# Patient Record
Sex: MALE | Race: WHITE
[De-identification: names, ages, dates, MRNs, and addresses within clinical notes are randomized per-mention and may not be internally consistent; named-entity substitution may affect disease eponyms.]

---

## 2019-02-28 NOTE — EDM.PDOC
<Jacque Lazo NINO - Last Filed: 19 22:02>





ED HPI GENERAL MEDICAL PROBLEM





- General


Chief Complaint: Chest Pain


Stated Complaint: DIZZY/CHEST PRESSURE


Time Seen by Provider: 19 20:45


Source of Information: Reports: Patient, RN Notes Reviewed, Significant Other


History Limitations: Reports: No Limitations





- History of Present Illness


INITIAL COMMENTS - FREE TEXT/NARRATIVE: 





Brant is a 28 yo male who presents to the ER with complaints of chest 

discomfort in the epigastric region that radiates to the left chest. Wife and 

daughter are in the room with him. His main concern is the dizzy spells that he 

has had today. He did not black out but had to really concentrate to stay up 

right. He had been sitting but stood up and the dizziness got better. His worst 

episode was when he crawled up into the crane and thought he went up to fast. 

He does have a history of GERD and was started on Wellbutrin for depression 

about 3 1/2 weeks ago. He is under a lot of stress due to his grandmother being 

ill and needing to leave for [place] because of this.  





Onset: Today


Onset Date: 19


Onset Time: 16:00


Duration: Intermittent


Location: Reports: Head, Chest


Quality: Reports: Sharp


Severity: Moderate


Improves with: Reports: None


Context: Reports: Activity


Associated Symptoms: Reports: Other (dizziness)


  ** Chest


Pain Score (Numeric/FACES): 1





- Related Data


 Allergies











Allergy/AdvReac Type Severity Reaction Status Date / Time


 


No Known Allergies Allergy   Verified 19 20:40











Home Meds: 


 Home Meds





Levothyroxine 75 mg PO DAILY 19 [History]


Omeprazole 20 mg PO DAILY PRN 19 [History]


buPROPion [Wellbutrin] 300 mg PO DAILY 19 [History]











Past Medical History


Gastrointestinal History: Reports: GERD, Other (See Below)


Other Gastrointestinal History: acid reflux


Psychiatric History: Reports: Depression (on Wellbutrin)





- Past Surgical History


HEENT Surgical History: Reports: Oral Surgery





Social & Family History





- Family History


Other Cardiac Family History: Uncle  around age 40 of cardiac issues.





- Tobacco Use


Smoking Status *Q: Former Smoker


Used Tobacco, but Quit: Yes


Month/Year Tobacco Last Used: 18mo ago





- Caffeine Use


Caffeine Use: Reports: Coffee, Energy Drinks, Soda, Tea





- Recreational Drug Use


Recreational Drug Use: No





ED ROS GENERAL





- Review of Systems


Review Of Systems: See Below


Constitutional: Reports: No Symptoms


HEENT: Reports: No Symptoms


Respiratory: Reports: No Symptoms


Cardiovascular: Reports: Chest Pain


Endocrine: Reports: No Symptoms


GI/Abdominal: Reports: No Symptoms


: Reports: No Symptoms


Musculoskeletal: Reports: No Symptoms


Skin: Reports: No Symptoms


Neurological: Reports: Dizziness


Psychiatric: Reports: Anxiety, Depression


Hematologic/Lymphatic: Reports: No Symptoms


Immunologic: Reports: No Symptoms





ED EXAM, GENERAL





- Physical Exam


Exam: See Below


Exam Limited By: No Limitations


General Appearance: Alert, WD/WN, Anxious


Ears: Normal External Exam


Nose: Normal Inspection


Throat/Mouth: Normal Lips


Head: Atraumatic, Normocephalic


Neck: Normal Inspection, Supple


Respiratory/Chest: No Respiratory Distress, Lungs Clear, Normal Breath Sounds, 

No Accessory Muscle Use, Chest Non-Tender


Cardiovascular: Normal Peripheral Pulses, Regular Rate, Rhythm, No Edema, No 

Gallop, No JVD, No Murmur, No Rub


GI/Abdominal: Normal Bowel Sounds, Soft, Non-Tender, No Distention


 (Male) Exam: Deferred


Rectal (Males) Exam: Deferred


Neurological: Alert, Oriented, CN II-XII Intact, Normal Cognition


Psychiatric: Anxious, Tearful (patient appears stressed. )


Skin Exam: Warm, Dry, Intact, Normal Color, No Rash





Course





- Vital Signs


Last Recorded V/S: 


 Last Vital Signs











Temp  36.6 C   19 20:38


 


Pulse  97   19 20:38


 


Resp  18   19 20:38


 


BP  160/99 H  19 20:38


 


Pulse Ox  98   19 20:38








 





Orthostatic Blood Pressure [     150/88


Standing]                        


Orthostatic Blood Pressure [     141/90


Supine]                          











- Orders/Labs/Meds


Orders: 


 Active Orders 24 hr











 Category Date Time Status


 


 EKG Documentation Completion [RC] STAT Care  19 21:01 Active


 


 Orthostatic Vital Signs [RC] ASDIRECTED Care  19 21:00 Active


 


 Chest 1V Frontal [CR] Stat Exams  19 21:01 Taken


 


 Sodium Chloride 0.9% [Normal Saline] 1,000 ml Med  19 21:15 Active





 IV ASDIRECTED   








 Medication Orders





Sodium Chloride (Normal Saline)  1,000 mls @ 100 mls/hr IV ASDIRECTED VINICIO


   Last Admin: 19 21:15  Dose: 100 mls/hr








Labs: 


 Laboratory Tests











  19 Range/Units





  21:10 21:10 21:10 


 


WBC  7.20    (4.23-9.07)  K/mm3


 


RBC  5.41    (4.63-6.08)  M/mm3


 


Hgb  15.5    (13.7-17.5)  gm/L


 


Hct  46.4    (40.1-51.0)  %


 


MCV  85.8    (79.0-92.2)  fl


 


MCH  28.7    (25.7-32.2)  pg


 


MCHC  33.4    (32.2-35.5)  g/dl


 


RDW Std Deviation  40.4    (35.1-43.9)  fL


 


Plt Count  287    (163-337)  K/mm3


 


MPV  10.8    (9.4-12.3)  fl


 


Neutrophils % (Manual)  72 H    (40-60)  %


 


Band Neutrophils %  1    (0-10)  %


 


Lymphocytes % (Manual)  21    (20-40)  %


 


Atypical Lymphs %  0    %


 


Monocytes % (Manual)  5    (2-10)  %


 


Eosinophils % (Manual)  1    (0.8-7.0)  %


 


Basophils % (Manual)  0 L    (0.2-1.2)  


 


Platelet Estimate  Adequate    


 


RBC Morph Comment  Normal    


 


D-Dimer, Quantitative    < 0.19 L  (0.19-0.50)  mg/L


 


Sodium   138   (136-145)  mEq/L


 


Potassium   3.3 L   (3.5-5.1)  mEq/L


 


Chloride   103   ()  mEq/L


 


Carbon Dioxide   26   (21-32)  mEq/L


 


Anion Gap   12.3   (5-15)  


 


BUN   13   (7-18)  mg/dL


 


Creatinine   1.3   (0.7-1.3)  mg/dL


 


Est Cr Clr Drug Dosing   84.60   mL/min


 


Estimated GFR (MDRD)   > 60   (>60)  mL/min


 


BUN/Creatinine Ratio   10.0 L   (14-18)  


 


Glucose   126 H   ()  mg/dL


 


Calcium   8.9   (8.5-10.1)  mg/dL


 


Magnesium   1.9   (1.8-2.4)  mg/dl


 


Total Bilirubin   0.5   (0.2-1.0)  mg/dL


 


AST   26   (15-37)  U/L


 


ALT   60   (16-63)  U/L


 


Alkaline Phosphatase   69   ()  U/L


 


CK-MB (CK-2)   1.0   (0-3.6)  ng/ml


 


Troponin I   < 0.017   (0.00-0.056)  ng/mL


 


C-Reactive Protein   0.9   (<1.0)  mg/dL


 


Total Protein   7.7   (6.4-8.2)  g/dl


 


Albumin   4.1   (3.4-5.0)  g/dl


 


Globulin   3.6   gm/dL


 


Albumin/Globulin Ratio   1.1   (1-2)  











Meds: 


Medications











Generic Name Dose Route Start Last Admin





  Trade Name Freq  PRN Reason Stop Dose Admin


 


Sodium Chloride  1,000 mls @ 100 mls/hr  19 21:15  19 21:15





  Normal Saline  IV   100 mls/hr





  ASDIRECTED VINICIO   Administration





     





     





     





     














Discontinued Medications














Generic Name Dose Route Start Last Admin





  Trade Name Freq  PRN Reason Stop Dose Admin


 


Al Hydroxide/Mg Hydroxide 30  0 ml  19 21:50  19 21:54





  ml/ Lidocaine HCl 15 ml  PO  19 21:51  45 ml





  ONETIME ONE   Administration





     





     





     





     














- Re-Assessments/Exams


Free Text/Narrative Re-Assessment/Exam: 





19 21:20  spoke with patient and explained the labs, ekg and chest xray. 

Will give him a GI cocktail to help with the epigastric discomfort. Pt is not 

real sure about the cocktail because he doesn't think it is reflux, explained 

that it will not hurt him but will help if it is reflux. 





19 21:59  Spoke with patient and is wife about ekg and chest xray. 

reassurance given that they are normal. Waiting for labs. Will give the GI 

cocktail and see if that helps. Will update him on labs when they are all 

complete. 








Departure





- Departure


Disposition: Home, Self-Care 01


Clinical Impression: 


 Non-cardiac chest pain





Instructions:  Nonspecific Chest Pain


Referrals: 


Arlen Solares NP [Primary Care Provider] - 


Forms:  ED Department Discharge


Additional Instructions: 


Evaluation the emergency room tonight in regards to central chest discomfort 

started tonight. Recognizing that you are under good deal of stress with 

grandmother's illness in [place] with plans to travel there tonight. All of the 

investigations done through the emergency room tonight including heart tracing, 

chest x-ray, lab tests to rule out heart attack a look at what was called 

cardiac markers that can only come from damaged heart muscle are negative. Also 

no evidence of any blood clot in the lung. Therefore all the tests that we ran 

show no evidence of heart related illness. History of gastroesophageal reflux 

appreciated. Is often the cause of chest pain with spasm of the lower food pipe 

due to reflux of some acid into the lower one third of the food pipe while we 

are sleeping. This can leave the food pipe irritated and inflamed and can cause 

spasm and chest pains for 10 days after one bad night of reflux. I would 

therefore suggest going on Prilosec at bedtime daily for the next 2 weeks and 

then every other night to prevent permanent damage to the lower food pipe. 





- My Orders


Last 24 Hours: 


My Active Orders





19 21:00


Orthostatic Vital Signs [RC] ASDIRECTED 





19 21:01


EKG Documentation Completion [RC] STAT 


Chest 1V Frontal [CR] Stat 





19 21:15


Sodium Chloride 0.9% [Normal Saline] 1,000 ml IV ASDIRECTED 














- Assessment/Plan


Last 24 Hours: 


My Active Orders





19 21:00


Orthostatic Vital Signs [RC] ASDIRECTED 





19 21:01


EKG Documentation Completion [RC] STAT 


Chest 1V Frontal [CR] Stat 





19 21:15


Sodium Chloride 0.9% [Normal Saline] 1,000 ml IV ASDIRECTED 














<Herb Nathan - Last Filed: 19 22:40>





Past Medical History


Gastrointestinal History: Reports: GERD





Social & Family History





- Tobacco Use


Smoking Status *Q: Former Smoker (Quit cigarettes about 18 months ago was 1 pack

-a-day smoker)





- Living Situation & Occupation


Living situation: Reports: 


Occupation: Employed





EKG INTERPRETATION


EKG Date: 19


Time: 21:22


Rhythm: NSR


Rate (Beats/Min): 86


Axis: Normal


P-Wave: Present


QRS: Other (Nonspecific intraventricular trickier conduction delay pattern. 

Mildly decreased voltage in the precordial leads)


ST-T: Normal


QT: Normal


EKG Interpretation Comments: 





No signs of ischemia





Course





- Radiology Interpretation


Free Text/Narrative:: 





29-year-old male presents to the ED with diffuse epigastric lower retrosternal 

chest discomfort. Recognizes under good deal of duress at present is his 

grandmother is very ill in [place] and they have plans to [place] driving towards [place] 

told tonight to trying to the bedside before she passes. This evening he 

developed a pressure sensation in his epigastrium and lower retrosternal chest 

with no radiation to the neck back or arm. He has no known cardiac history. He 

does have a history of gastroesophageal reflux disease and often will wake up 

at night with acid in his mouth and throat. He uses Prilosec on a when 

necessary basis usually when he has symptoms but often will go several weeks 

without needing it. He denies cough or sputum production. Does feel mildly 

short of breath. Describes the discomfort with a bit of a burning discomfort to 

it. Plan ECG done by triage nurse shows sinus rhythm at 86/m with nonspecific 

intraventricular conduction delay pattern. There is no signs of ischemia. Plan 

1 view chest x-ray routine labs including cardiac markers and d-dimer to be 

done.





- Re-Assessments/Exams


Free Text/Narrative Re-Assessment/Exam: 








19 21:51 Hematology reveals a normal white count at 7.20 with 72% 

neutrophils and 1% band cells. Hemoglobin is 15.5 with hematocrit of 46.4. 

Platelet count is 287,000. D-dimer is less than 0.19. Chest x-ray is within 

normal limits. ECG is sinus rhythm at 86/m with a nonspecific intraventricular 

conduction delay and mildly decreased voltage in the precordial leads but no 

signs of ischemia.





19 22:07 Sodium is 138 with a potassium slightly low at 3.3. Chloride 103 

with a bicarbonate 26. Anion gap is 12.3 with a BUN of 13 and a creatinine of 

1.3. GFR is greater than 60. BUN/creatinine ratio is 10.0. Glucose 126. Calcium 

8.9 with magnesium of 1.9. Liver function is normal. CK-MB is 1.0 with troponin 

I of less than 0.017. C-reactive protein is 0.9. Total protein is 7.7 with an 

albumin fraction of 4.1.





19 22:20: Discussed the findings with the patient. Reassured that there 

is no evidence that he has any heart related illness. I feel it is safe for him 

[place] drive [place] alternating with his wife so they get adequate 

sleep. I believe his pain is likely gastroesophageal in origin with 

gastroesophageal reflux into the lower food pipe likely during sleep. Advised 

him to take Prilosec every day at bedtime for the next 2 weeks and then every 

other night to minimize damage to the esophagus over time. He reports that the 

GI cocktail did provide him with some relief of the pain.  discharged to 

home. Follow-up with personal care physician if any further problems occur.











Departure





- Departure


Time of Disposition: 22:38

## 2019-02-28 NOTE — EDM.PDOC
ED HPI GENERAL MEDICAL PROBLEM





- General


Chief Complaint: Chest Pain


Stated Complaint: DIZZY/CHEST PRESSURE


Time Seen by Provider: 19 21:00


Source of Information: Reports: Patient, Family


History Limitations: Reports: No Limitations (Spouse)





- History of Present Illness


INITIAL COMMENTS - FREE TEXT/NARRATIVE: 


Note there are 2 charts in this patient. Patient was seen in concert with joint 

Karyn ROSAS student. I did see this patient in consultation and examined him as 

well. He presents to the ED with epigastric lower retrosternal chest pain that 

radiates slightly to the left precordial chest. Associated with to dizzy spells 

today by that he means he got lightheaded and felt like he might pass out for a 

period of time. Of note he was around his brain at the time this occurred an 

unclear whether or not he could've been exposed to excessive carbon monoxide. 

He recognizes under great deal of stress recently as well since his grandmother 

is in [facility] in [place] and they plan to travel  [facility] to 

hopefully get to her bedside to say goodbye by as she is not expected to 

survive current illness. He has a history of gastroesophageal reflux and uses 

Prilosec when necessary. His biggest concern is the dizzy spells that occurred 

for no apparent reason. He is on Wellbutrin daily in the dosage was increased 

10 days ago which should maintain that he is over the side effect profile which 

is can be flushing and dizziness related to the medication effect. Vital signs 

are stable and he is recognizably quite anxious on examination.





Onset: Today


Onset Date: 19


Onset Time: 14:00


Duration: Hour(s):, Intermittent


Location: Reports: Chest, Other (Some lower retrosternal chest discomfort left 

precordial chest pain dizziness to the point of feeling like he might pass out 

2 this afternoon)


Quality: Reports: Burning


Severity: Moderate (Light burning discomfort to the pain in his chest.)


Improves with: Reports: None ( Would rated as a 3 or 4 out of 10.)


Worsens with: Reports: None


Context: Denies: Activity, Exercise, Lifting, Sick Contact, Trauma, Other


Associated Symptoms: Reports: Chest Pain (Lower retrosternal chest discomfort 

left precordial chest discomfort), Other (Gato near syncope with dizziness 

lightheadedness. No true vertigo symptoms).  Denies: Cough, cough w sputum, 

Fever/Chills, Headaches, Loss of Appetite, Malaise, Rash, Seizure, Shortness of 

Breath, Syncope


Treatments PTA: Reports: Other (see below)


  ** Chest


Pain Score (Numeric/FACES): 1





- Related Data


 Allergies











Allergy/AdvReac Type Severity Reaction Status Date / Time


 


No Known Allergies Allergy   Verified 19 20:40











Home Meds: 


 Home Meds





Levothyroxine 75 mg PO DAILY 19 [History]


Omeprazole 20 mg PO DAILY PRN 19 [History]


buPROPion [Wellbutrin] 300 mg PO DAILY 19 [History]











Past Medical History


Gastrointestinal History: Reports: Other (See Below)


Other Gastrointestinal History: acid reflux


Endocrine/Metabolic History: Reports: Hypothyroidism (And is on supplement for 

this. Blood levels were checked yesterday in clinic.)





- Past Surgical History


HEENT Surgical History: Reports: Oral Surgery





Social & Family History





- Tobacco Use


Smoking Status *Q: Former Smoker


Used Tobacco, but Quit: Yes


Month/Year Tobacco Last Used: 18mo ago





- Caffeine Use


Caffeine Use: Reports: Coffee, Energy Drinks, Soda, Tea





- Recreational Drug Use


Recreational Drug Use: No





ED ROS GENERAL





- Review of Systems


Review Of Systems: See Below


Constitutional: Denies: Fever, Chills, Malaise, Weakness, Decreased Appetite, 

Weight Loss


HEENT: Reports: No Symptoms


Respiratory: Reports: No Symptoms.  Denies: Shortness of Breath, Cough, 

Hemoptysis, Other


Cardiovascular: Reports: Chest Pain, Lightheadedness (History of present 

illness to lightheaded episodes occurred today while at work. I have some 

concerns he may been exposed to excessive amounts of carbon dioxide with the 

Crane running.)


Endocrine: Reports: Fatigue


GI/Abdominal: Reports: Abdominal Pain, Other (Does have some epigastric 

abdominal pain that seems to be referred up into his lower retrosternal area.)


: Reports: No Symptoms ( GERD which he uses Prilosec for intermittently.)


Musculoskeletal: Reports: Back Pain (Occasional problems with low back pain.)


Skin: Reports: No Symptoms


Neurological: Reports: Dizziness (Felt dizzy 2 today to the point where he 

felt like he might pass out. Unaware of his heart racing skipping and jumping 

at the time)


Psychiatric: Reports: Anxiety


Hematologic/Lymphatic: Reports: No Symptoms (He has started on Wellbutrin about 

6 weeks ago for anxiety relief. Dosage was increased 10 days ago.)


Immunologic: Reports: No Symptoms





ED EXAM, GENERAL





- Physical Exam


Exam: See Below


Exam Limited By: No Limitations


General Appearance: Alert, WD/WN, Anxious, Mild Distress


Eye Exam: Bilateral Eye: Normal Inspection


Ears: Normal TMs


Throat/Mouth: Normal Inspection, Normal Lips, Normal Oropharynx, Other


Head: Atraumatic, Normocephalic (Uvula is in the midline)


Neck: Normal Inspection, Supple, Non-Tender, Full Range of Motion.  No: Carotid 

Bruit, Lymphadenopathy (L), Lymphadenopathy (R)


Respiratory/Chest: No Respiratory Distress, Lungs Clear, Normal Breath Sounds, 

No Accessory Muscle Use


Cardiovascular: Normal Peripheral Pulses, Regular Rate, Rhythm, No Edema, No 

Gallop, No Murmur, No Rub


Peripheral Pulses: 3+: Posterior Tibial (L), Posterior Tibial (R), Dorsalis 

Pedis (L), Dorsalis Pedis (R)


GI/Abdominal: Normal Bowel Sounds, Soft, Tender (Slight tenderness in the 

epigastrium only)


Extremities: Normal Inspection, Normal Range of Motion, Non-Tender, Normal 

Capillary Refill


Neurological: Alert, Oriented, CN II-XII Intact, Normal Cognition, Normal Gait


Psychiatric: Normal Affect, Normal Mood


Skin Exam: Warm, Dry, Intact, Normal Color, No Rash





Course





- Vital Signs


Last Recorded V/S: 


 Last Vital Signs











Temp  36.6 C   19 20:38


 


Pulse  97   19 20:38


 


Resp  18   19 20:38


 


BP  160/99 H  19 20:38


 


Pulse Ox  98   19 20:38








 





Orthostatic Blood Pressure [     150/88


Standing]                        


Orthostatic Blood Pressure [     141/90


Supine]                          











- Orders/Labs/Meds


Orders: 


 Active Orders 24 hr











 Category Date Time Status


 


 EKG Documentation Completion [RC] STAT Care  19 21:01 Active


 


 Orthostatic Vital Signs [RC] ASDIRECTED Care  19 21:00 Active


 


 Chest 1V Frontal [CR] Stat Exams  19 21:01 Taken











Labs: 


 Laboratory Tests











  19 Range/Units





  21:10 21:10 21:10 


 


WBC  7.20    (4.23-9.07)  K/mm3


 


RBC  5.41    (4.63-6.08)  M/mm3


 


Hgb  15.5    (13.7-17.5)  gm/L


 


Hct  46.4    (40.1-51.0)  %


 


MCV  85.8    (79.0-92.2)  fl


 


MCH  28.7    (25.7-32.2)  pg


 


MCHC  33.4    (32.2-35.5)  g/dl


 


RDW Std Deviation  40.4    (35.1-43.9)  fL


 


Plt Count  287    (163-337)  K/mm3


 


MPV  10.8    (9.4-12.3)  fl


 


Neutrophils % (Manual)  72 H    (40-60)  %


 


Band Neutrophils %  1    (0-10)  %


 


Lymphocytes % (Manual)  21    (20-40)  %


 


Atypical Lymphs %  0    %


 


Monocytes % (Manual)  5    (2-10)  %


 


Eosinophils % (Manual)  1    (0.8-7.0)  %


 


Basophils % (Manual)  0 L    (0.2-1.2)  


 


Platelet Estimate  Adequate    


 


RBC Morph Comment  Normal    


 


D-Dimer, Quantitative    < 0.19 L  (0.19-0.50)  mg/L


 


Sodium   138   (136-145)  mEq/L


 


Potassium   3.3 L   (3.5-5.1)  mEq/L


 


Chloride   103   ()  mEq/L


 


Carbon Dioxide   26   (21-32)  mEq/L


 


Anion Gap   12.3   (5-15)  


 


BUN   13   (7-18)  mg/dL


 


Creatinine   1.3   (0.7-1.3)  mg/dL


 


Est Cr Clr Drug Dosing   84.60   mL/min


 


Estimated GFR (MDRD)   > 60   (>60)  mL/min


 


BUN/Creatinine Ratio   10.0 L   (14-18)  


 


Glucose   126 H   ()  mg/dL


 


Calcium   8.9   (8.5-10.1)  mg/dL


 


Magnesium   1.9   (1.8-2.4)  mg/dl


 


Total Bilirubin   0.5   (0.2-1.0)  mg/dL


 


AST   26   (15-37)  U/L


 


ALT   60   (16-63)  U/L


 


Alkaline Phosphatase   69   ()  U/L


 


CK-MB (CK-2)   1.0   (0-3.6)  ng/ml


 


Troponin I   < 0.017   (0.00-0.056)  ng/mL


 


C-Reactive Protein   0.9   (<1.0)  mg/dL


 


Total Protein   7.7   (6.4-8.2)  g/dl


 


Albumin   4.1   (3.4-5.0)  g/dl


 


Globulin   3.6   gm/dL


 


Albumin/Globulin Ratio   1.1   (1-2)  











Meds: 


Medications














Discontinued Medications














Generic Name Dose Route Start Last Admin





  Trade Name Radha  PRN Reason Stop Dose Admin


 


Al Hydroxide/Mg Hydroxide 30  0 ml  19 21:50  19 21:54





  ml/ Lidocaine HCl 15 ml  PO  19 21:51  45 ml





  ONETIME ONE   Administration





     





     





     





     


 


Sodium Chloride  1,000 mls @ 100 mls/hr  19 21:15  19 21:15





  Normal Saline  IV   100 mls/hr





  ASDIRECTED VINICIO   Administration





     





     





     





     














- Radiology Interpretation


Free Text/Narrative:: 


21-year-old male presents to the ED with epigastric pressure discomfort rating 

up into the lower retrosternal area of his chest and left precordium. He also 

describes 2 events this afternoon while at work where he became quite dizzy 

lightheaded and felt like he might pass out. He did not recognize any heart 

palpitations at the time. This is never happened before. His cream was running 

at the time I question whether or not he could've been exposed to carbon 

monoxide to cause these symptoms. He thought about it for a while and says it's 

possible. He is under great deal of duress at this point time with his 

grandmother in very poor health in [place]. A plan to drive to [facility] a 14 

Hour Dr. to try and get to her bedside before she succumbs to her current 

illness. I believe this is contributing to some of his chest discomfort as 

well. He also has a history of GERD and is not been using his Prilosec for the 

last week. Spleen that he can still be refluxing at nighttime into the lower 

third of the food pipe which may be causing some of his current symptoms as 

well. Landed you'll have a complete cardiac workup. His ECG revealed sinus 

rhythm at 86/m with a nonspecific intraventricular conduction delay but no 

signs of ischemia. Chest x-ray cardiac markers and a d-dimer will be done.








- Re-Assessments/Exams


Free Text/Narrative Re-Assessment/Exam: 





19 22:20; chest x-rays within normal limits.





19 22:30: Labs reveal a normal white count at 7.20 with 72% neutrophils 

and 1% band cells. Hemoglobin is 15.5 with hematocrit of 46.4. Platelet count 

is 287,000. D-dimer is less than 0.19. Sodium 138 with potassium slightly low 

at 3.3. Chloride 103 with a bicarbonate 26. Anion gap is 12.3 with a BUN of 13. 

Creatinine was 1.3 GFR is greater than 60. BUN/creatinine ratio was 10.0. 

Glucose is 126. Calcium 8.9 with a magnesium of 1.9. Liver function is normal. 

CK-MB is 1.0 C-reactive protein is 0.9 troponin I is less than 0.017. I 

discussed the findings with the patient. I reassured him that there appears to 

be no evidence of heart related illness. Strongly suspect that he suffered from 

esophageal spasm from likely reflux during sleep. Advised him to resume 

Prilosec 20 mg once daily at bedtime for the next 2 weeks and then take it 

every other night. In regards to the dizzy spells signs just instructed to 

adopt a wait and see approach. Consider whether or not he may have been exposed 

to carbon monoxide today from his crane running. Follow up with personal care 

provider if any further problems occur.














Departure





- Departure


Time of Disposition: 22:28


Disposition: Home, Self-Care 01


Condition: Fair


Clinical Impression: 


 Non-cardiac chest pain





Instructions:  Nonspecific Chest Pain


Referrals: 


Arlen Solares NP [Primary Care Provider] - 


Forms:  ED Department Discharge


Additional Instructions: 


Evaluation the emergency room tonight in regards to central chest discomfort 

started tonight. Recognizing that you are under good deal of stress with 

grandmother's illness in [place] with plans to travel there tonight. All of the 

investigations done through the emergency room tonight including heart tracing, 

chest x-ray, lab tests to rule out heart attack a look at what was called 

cardiac markers that can only come from damaged heart muscle are negative. Also 

no evidence of any blood clot in the lung. Therefore all the tests that we ran 

show no evidence of heart related illness. History of gastroesophageal reflux 

appreciated. Is often the cause of chest pain with spasm of the lower food pipe 

due to reflux of some acid into the lower one third of the food pipe while we 

are sleeping. This can leave the food pipe irritated and inflamed and can cause 

spasm and chest pains for 10 days after one bad night of reflux. I would 

therefore suggest going on Prilosec at bedtime daily for the next 2 weeks and 

then every other night to prevent permanent damage to the lower food pipe. 





- My Orders


Last 24 Hours: 


My Active Orders





19 21:00


Orthostatic Vital Signs [RC] ASDIRECTED 





19 21:01


EKG Documentation Completion [RC] STAT 


Chest 1V Frontal [CR] Stat 














- Assessment/Plan


Last 24 Hours: 


My Active Orders





19 21:00


Orthostatic Vital Signs [RC] ASDIRECTED 





19 21:01


EKG Documentation Completion [RC] STAT 


Chest 1V Frontal [CR] Stat

## 2019-03-01 NOTE — CR
Chest: Portable view of the chest was obtained.

 

Comparison: No prior chest x-ray.

 

Heart size and mediastinum are normal.  Linear scar or discoid 

atelectasis is seen within the right midlung.  Lungs otherwise are 

clear.  Bony structures are unremarkable.

 

Impression:

1.  Linear area of scarring or discoid atelectasis on the right side. 

 Nothing acute is otherwise seen.

 

Diagnostic code #2

## 2022-04-22 ENCOUNTER — HOSPITAL ENCOUNTER (EMERGENCY)
Dept: HOSPITAL 41 - JD.ED | Age: 32
Discharge: HOME | End: 2022-04-22
Payer: COMMERCIAL

## 2022-04-22 DIAGNOSIS — Z79.899: ICD-10-CM

## 2022-04-22 DIAGNOSIS — K21.9: ICD-10-CM

## 2022-04-22 DIAGNOSIS — E03.9: ICD-10-CM

## 2022-04-22 DIAGNOSIS — R07.89: Primary | ICD-10-CM

## 2022-11-20 ENCOUNTER — HOSPITAL ENCOUNTER (EMERGENCY)
Dept: HOSPITAL 41 - JD.ED | Age: 32
Discharge: HOME | End: 2022-11-20
Payer: COMMERCIAL

## 2022-11-20 DIAGNOSIS — Z79.899: ICD-10-CM

## 2022-11-20 DIAGNOSIS — F17.210: ICD-10-CM

## 2022-11-20 DIAGNOSIS — G51.0: Primary | ICD-10-CM

## 2022-11-20 PROCEDURE — 99283 EMERGENCY DEPT VISIT LOW MDM: CPT

## 2025-05-11 ENCOUNTER — HOSPITAL ENCOUNTER (EMERGENCY)
Dept: HOSPITAL 41 - JD.ED | Age: 35
Discharge: HOME | End: 2025-05-11
Payer: MEDICAID

## 2025-05-11 DIAGNOSIS — E03.9: ICD-10-CM

## 2025-05-11 DIAGNOSIS — K81.9: Primary | ICD-10-CM

## 2025-05-11 DIAGNOSIS — Z79.899: ICD-10-CM

## 2025-05-11 LAB
ALBUMIN SERPL-MCNC: 3.7 G/DL (ref 3.4–5)
ALBUMIN/GLOB SERPL: 0.9 {RATIO} (ref 1–2)
ANION GAP SERPL CALC-SCNC: 10.4 MMOL/L (ref 5–15)
BASOPHILS # BLD AUTO: 0.1 K/MM3 (ref 0–0.2)
BASOPHILS NFR BLD AUTO: 0.6 % (ref 0–1)
BILIRUB SERPL-MCNC: 0.4 MG/DL (ref 0.2–1)
BUN/CREAT SERPL: 8.6 (ref 14–18)
CALCIUM SERPL-MCNC: 9.1 MG/DL (ref 8.5–10.1)
CREAT CL 24H UR+SERPL-VRATE: 74.35 ML/MIN
CREAT SERPL-MCNC: 1.4 MG/DL (ref 0.7–1.3)
CRP SERPL-MCNC: 0.79 MG/DL (ref ?–0.3)
EOSINOPHIL # BLD AUTO: 0.2 K/MM3 (ref 0–0.4)
EOSINOPHIL NFR BLD AUTO: 2.2 % (ref 0–6)
GLOBULIN SER-MCNC: 4.1 GM/DL
HCT VFR BLD AUTO: 48.6 % (ref 42–52)
HGB BLD-MCNC: 16.3 GM/DL (ref 14–18)
IMM GRANULOCYTES # BLD: 0.02 K/MM3 (ref 0–0.05)
IMM GRANULOCYTES NFR BLD: 0.2 % (ref 0–0.4)
LYMPHOCYTES # BLD AUTO: 2.7 K/MM3 (ref 1–4.8)
LYMPHOCYTES NFR BLD AUTO: 30.4 % (ref 24–44)
MCH RBC QN AUTO: 29.7 PG (ref 28–32)
MCHC RBC AUTO-ENTMCNC: 33.5 G/DL (ref 32–36)
MCHC RBC AUTO-ENTMCNC: 88.5 FL (ref 83–99)
MONOCYTES # BLD AUTO: 0.8 K/MM3 (ref 0–0.8)
MONOCYTES NFR BLD AUTO: 9.4 % (ref 0–8)
NEUTROPHILS # BLD AUTO: 5.1 K/MM3 (ref 1.8–7.7)
NEUTROPHILS NFR BLD AUTO: 57.2 % (ref 41–71)
PLATELET # BLD AUTO: 304 K/MM3 (ref 150–400)
PMV BLD AUTO: 10.9 FL (ref 9.4–12.4)
POTASSIUM SERPL-SCNC: 3.4 MEQ/L (ref 3.5–5.1)
PROT SERPL-MCNC: 7.8 G/DL (ref 6.4–8.2)
RBC # BLD AUTO: 5.49 M/MM3 (ref 4.52–5.9)
WBC # BLD AUTO: 8.91 K/MM3 (ref 3.9–11.3)

## 2025-05-11 PROCEDURE — 36415 COLL VENOUS BLD VENIPUNCTURE: CPT

## 2025-05-11 PROCEDURE — 83690 ASSAY OF LIPASE: CPT

## 2025-05-11 PROCEDURE — 96375 TX/PRO/DX INJ NEW DRUG ADDON: CPT

## 2025-05-11 PROCEDURE — 99284 EMERGENCY DEPT VISIT MOD MDM: CPT

## 2025-05-11 PROCEDURE — 74177 CT ABD & PELVIS W/CONTRAST: CPT

## 2025-05-11 PROCEDURE — 86140 C-REACTIVE PROTEIN: CPT

## 2025-05-11 PROCEDURE — 96361 HYDRATE IV INFUSION ADD-ON: CPT

## 2025-05-11 PROCEDURE — 96374 THER/PROPH/DIAG INJ IV PUSH: CPT

## 2025-05-11 PROCEDURE — 85025 COMPLETE CBC W/AUTO DIFF WBC: CPT

## 2025-05-11 PROCEDURE — 80053 COMPREHEN METABOLIC PANEL: CPT

## 2025-05-11 RX ADMIN — IOPAMIDOL ONE ML: 612 INJECTION, SOLUTION INTRAVENOUS at 20:50

## 2025-05-11 RX ADMIN — ALUMINUM HYDROXIDE, MAGNESIUM HYDROXIDE, AND SIMETHICONE ONE ML: 200; 200; 20 SUSPENSION ORAL at 20:30

## 2025-05-11 RX ADMIN — Medication PRN ML: at 20:50

## 2025-05-28 ENCOUNTER — HOSPITAL ENCOUNTER (OUTPATIENT)
Dept: HOSPITAL 41 - JD.SDS | Age: 35
Discharge: HOME | End: 2025-05-28
Attending: SURGERY
Payer: MEDICAID

## 2025-05-28 DIAGNOSIS — K21.9: ICD-10-CM

## 2025-05-28 DIAGNOSIS — K40.90: ICD-10-CM

## 2025-05-28 DIAGNOSIS — Z87.891: ICD-10-CM

## 2025-05-28 DIAGNOSIS — E03.9: ICD-10-CM

## 2025-05-28 DIAGNOSIS — K80.64: Primary | ICD-10-CM

## 2025-05-28 PROCEDURE — 47562 LAPAROSCOPIC CHOLECYSTECTOMY: CPT

## 2025-05-28 RX ADMIN — CEFUROXIME ONE MG: 750 INJECTION, POWDER, FOR SOLUTION INTRAMUSCULAR; INTRAVENOUS at 15:00
